# Patient Record
Sex: MALE
[De-identification: names, ages, dates, MRNs, and addresses within clinical notes are randomized per-mention and may not be internally consistent; named-entity substitution may affect disease eponyms.]

---

## 2022-05-20 ENCOUNTER — NURSE TRIAGE (OUTPATIENT)
Dept: OTHER | Facility: CLINIC | Age: 42
End: 2022-05-20

## 2022-05-21 NOTE — TELEPHONE ENCOUNTER
Subjective: Caller states \"lightheaded, sore throat, leg hurts, \" leg pain, by my ankle, looked normal with a little swelling feet are the same temp, [place] [facility] [place]anted to check heart and stuff for being worrisome. Might have twisted ankle. Current Symptoms: fatigue, sore throat, headache, runny nose    Associated Symptoms: reduced activity    Pain Severity: ankle pain reported    Temperature: denies fever per staff by unknown method    What has been tried: fluids and rest    LMP: NA Pregnant: NA    Recommended disposition: Home Care with hotel quarantine    Care advice provided, patient verbalizes understanding; denies any other questions or concerns.     Outcome: Home care with hotel quarantine advised    Reason for Disposition   [1] COVID-19 infection suspected by caller or triager AND [2] mild symptoms (cough, fever, or others) AND [3] has not gotten tested yet    Protocols used: CORONAVIRUS (COVID-19) DIAGNOSED OR SUSPECTED-[facility]